# Patient Record
Sex: FEMALE | Race: WHITE | NOT HISPANIC OR LATINO | URBAN - METROPOLITAN AREA
[De-identification: names, ages, dates, MRNs, and addresses within clinical notes are randomized per-mention and may not be internally consistent; named-entity substitution may affect disease eponyms.]

---

## 2023-02-21 ENCOUNTER — OFFICE (OUTPATIENT)
Dept: URBAN - METROPOLITAN AREA CLINIC 67 | Facility: CLINIC | Age: 33
End: 2023-02-21

## 2023-02-21 VITALS
SYSTOLIC BLOOD PRESSURE: 114 MMHG | HEART RATE: 80 BPM | WEIGHT: 234 LBS | RESPIRATION RATE: 14 BRPM | HEIGHT: 67 IN | DIASTOLIC BLOOD PRESSURE: 74 MMHG

## 2023-02-21 DIAGNOSIS — K21.9 GASTRO-ESOPHAGEAL REFLUX DISEASE WITHOUT ESOPHAGITIS: ICD-10-CM

## 2023-02-21 DIAGNOSIS — K76.0 FATTY (CHANGE OF) LIVER, NOT ELSEWHERE CLASSIFIED: ICD-10-CM

## 2023-02-21 DIAGNOSIS — R14.0 ABDOMINAL DISTENSION (GASEOUS): ICD-10-CM

## 2023-02-21 DIAGNOSIS — R10.9 UNSPECIFIED ABDOMINAL PAIN: ICD-10-CM

## 2023-02-21 PROCEDURE — 99214 OFFICE O/P EST MOD 30 MIN: CPT | Performed by: INTERNAL MEDICINE

## 2023-02-21 NOTE — SERVICEHPINOTES
Carmen Whiting   is seen today, after a bit of an absence, for a follow-up visit regarding chronic GERD and suspected IBS/intestinal spasm. 
br She returns today secondary to a several month (8-9) history of morning nausea and vomiting with associated heartburn. She had been taking OTC Omeprazole but did not feel that it helped so about 2 weeks ago she changed to OTC Nexium - however she has not noted any change/improvement. She was using tobacco products but stopped 2 weeks ago.br She denies any associated dysphagia/odynophagia, GI tract bleeding symptoms or unexplained weight loss. She does report episodes of abdominal cramping/discomfort (upper and lower) and bloating. I reviewed a RUQ ultrasound she had done in 4/2022 which was only remarkable for fatty liver. Her tTG IgA Ab titer was normal/negative in 8/2020

## 2023-02-21 NOTE — SERVICENOTES
I will check the above blood work and have her change her OTC Nexium to evening dosing as her morning nausea/vomiting may be a manifestation of night time reflux. I applauded her efforts at tobacco cessation which I suspect will help with her reflux/heartburn symptoms. 
We will also plan for a FIbroScan given the fatty liver seen on her previous ultrasound.

## 2023-03-28 ENCOUNTER — OFFICE (OUTPATIENT)
Dept: URBAN - METROPOLITAN AREA CLINIC 67 | Facility: CLINIC | Age: 33
End: 2023-03-28
Payer: OTHER GOVERNMENT

## 2023-03-28 VITALS — HEIGHT: 67 IN

## 2023-03-28 DIAGNOSIS — K76.0 FATTY (CHANGE OF) LIVER, NOT ELSEWHERE CLASSIFIED: ICD-10-CM

## 2023-03-28 PROCEDURE — 91200 LIVER ELASTOGRAPHY: CPT | Performed by: INTERNAL MEDICINE

## 2023-03-29 ENCOUNTER — AMBULATORY SURGICAL CENTER (OUTPATIENT)
Dept: URBAN - METROPOLITAN AREA SURGERY 19 | Facility: SURGERY | Age: 33
End: 2023-03-29
Payer: OTHER GOVERNMENT

## 2023-03-29 DIAGNOSIS — R12 HEARTBURN: ICD-10-CM

## 2023-03-29 DIAGNOSIS — K21.9 GASTRO-ESOPHAGEAL REFLUX DISEASE WITHOUT ESOPHAGITIS: ICD-10-CM

## 2023-03-29 PROCEDURE — 43235 EGD DIAGNOSTIC BRUSH WASH: CPT | Performed by: INTERNAL MEDICINE

## 2025-04-03 ENCOUNTER — OFFICE (OUTPATIENT)
Dept: URBAN - METROPOLITAN AREA CLINIC 67 | Facility: CLINIC | Age: 35
End: 2025-04-03
Payer: OTHER GOVERNMENT

## 2025-04-03 VITALS
HEART RATE: 90 BPM | OXYGEN SATURATION: 99 % | WEIGHT: 249 LBS | HEIGHT: 67 IN | DIASTOLIC BLOOD PRESSURE: 78 MMHG | SYSTOLIC BLOOD PRESSURE: 118 MMHG

## 2025-04-03 DIAGNOSIS — K21.9 GASTRO-ESOPHAGEAL REFLUX DISEASE WITHOUT ESOPHAGITIS: ICD-10-CM

## 2025-04-03 DIAGNOSIS — F17.200 NICOTINE DEPENDENCE, UNSPECIFIED, UNCOMPLICATED: ICD-10-CM

## 2025-04-03 DIAGNOSIS — K44.9 DIAPHRAGMATIC HERNIA WITHOUT OBSTRUCTION OR GANGRENE: ICD-10-CM

## 2025-04-03 DIAGNOSIS — Z83.719 FAMILY HISTORY OF COLON POLYPS, UNSPECIFIED: ICD-10-CM

## 2025-04-03 PROCEDURE — 99214 OFFICE O/P EST MOD 30 MIN: CPT | Performed by: INTERNAL MEDICINE

## 2025-04-03 RX ORDER — VONOPRAZAN FUMARATE 13.36 MG/1
TABLET ORAL
Qty: 20 | Refills: 0 | Status: ACTIVE
Start: 2025-04-03

## 2025-04-03 NOTE — SERVICENOTES
I gave her samples of Voquezna (10mg) to try to see if that works better for her then the Lansoprazole - I asked her to call back in 3 weeks with an update. We discussed going back to her Lansoprazole 15mg twice daily if she does not feel the Voquezna worked well for her.

## 2025-04-03 NOTE — SERVICEHPINOTES
Carmen Whiting   is seen today,   after a bit of an absence, for a follow-up visit regarding chronic GERD and suspected IBS/intestinal spasm.An EGD done in 3/2023 was only remarkable for a small hiatal hernia. brA RUQ ultrasound done in 4/2022 which was only remarkable for fatty liver (but no significant fatty liver or fibrosis was seen on 3/2023 FibroScan) and her tTG IgA Ab titer was normal/negative in 8/2020.A HIDA scan done in 4/2023 was normal as was a gastric emptying study in 5/2023. brBlood work done at the time of her last office visit in 2/2023 revealed a normal CBC, liver enzymes and CRP. Today, she reports that as long as she takes her Lansoprazole twice daily, her GERD symptoms are well controlled. She has tried stepping down to once daily (in the morning) but will develop significant night time reflux symptoms with associated nausea/vomiting after just a few days. No new issues/complaints to voice today.